# Patient Record
Sex: MALE
[De-identification: names, ages, dates, MRNs, and addresses within clinical notes are randomized per-mention and may not be internally consistent; named-entity substitution may affect disease eponyms.]

---

## 2018-11-28 ENCOUNTER — HOSPITAL ENCOUNTER (OUTPATIENT)
Dept: HOSPITAL 88 - OR | Age: 48
Discharge: HOME | End: 2018-11-28
Attending: SPECIALIST
Payer: COMMERCIAL

## 2018-11-28 VITALS — DIASTOLIC BLOOD PRESSURE: 80 MMHG | SYSTOLIC BLOOD PRESSURE: 128 MMHG

## 2018-11-28 DIAGNOSIS — Z01.810: ICD-10-CM
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
## 2018-11-28 DIAGNOSIS — M75.111: ICD-10-CM

## 2018-11-28 DIAGNOSIS — M65.811: ICD-10-CM

## 2018-11-28 DIAGNOSIS — S43.431A: Primary | ICD-10-CM

## 2018-11-28 PROCEDURE — 29807 SHO ARTHRS SRG RPR SLAP LES: CPT

## 2018-11-28 PROCEDURE — 93005 ELECTROCARDIOGRAM TRACING: CPT

## 2019-03-14 ENCOUNTER — HOSPITAL ENCOUNTER (OUTPATIENT)
Dept: HOSPITAL 88 - OT | Age: 49
LOS: 17 days | End: 2019-03-31
Attending: SPECIALIST
Payer: COMMERCIAL

## 2019-03-14 DIAGNOSIS — M25.512: ICD-10-CM

## 2019-03-14 DIAGNOSIS — S46.002D: Primary | ICD-10-CM

## 2019-03-14 DIAGNOSIS — M25.612: ICD-10-CM

## 2019-03-14 DIAGNOSIS — R53.1: ICD-10-CM

## 2019-04-18 ENCOUNTER — HOSPITAL ENCOUNTER (OUTPATIENT)
Dept: HOSPITAL 88 - OT | Age: 49
LOS: 12 days | End: 2019-04-30
Attending: SPECIALIST
Payer: COMMERCIAL

## 2019-04-18 DIAGNOSIS — M25.511: Primary | ICD-10-CM

## 2019-04-18 DIAGNOSIS — M75.101: ICD-10-CM

## 2019-04-18 PROCEDURE — 97139 UNLISTED THERAPEUTIC PX: CPT

## 2024-10-03 NOTE — OPERATIVE REPORT
DATE OF PROCEDURE:  November 28, 2018 

 

PREOPERATIVE DIAGNOSIS:  Right shoulder labral tear.



POSTOPERATIVE DIAGNOSES

1. Right shoulder labral tear.  

2. Right shoulder synovitis.  

3. Right shoulder partial rotator cuff tear.



PROCEDURES PERFORMED:  The patient underwent a

1. Right shoulder examination under anesthesia. 

2. Right shoulder arthroscopy.  

3. Right shoulder arthroscopic debridement of synovitis.  

4. Right shoulder arthroscopic debridement of a partial rotator cuff 

tear.  

5. Right shoulder arthroscopic superior labral anterior-posterior 

repair. 



ASSISTANT:  Rocio Gauthier.



ANESTHESIA:  General endotracheal intubation anesthesia.



INTRAVENOUS FLUIDS:  As per the anesthesia record.



OPERATIVE PROCEDURE IN DETAIL:  Mr. David was taken to the operating room 

and placed in the supine position on the operating room table.  Following 

induction of general anesthesia as well as endotracheal intubation, the 

patient's right upper extremity was examined under anesthesia.  He was 

found to have full passive range of motion of the shoulder joint.  There 

was no evidence of instability.  The patient's upper extremity was prepped 

and draped in standard surgical fashion.  Standard anterior and 

posterolateral arthroscopy portals were created without difficulty.  The 

scope was placed within the shoulder joint atraumatically.  Examination of 

the glenohumeral articulation demonstrated no significant glenohumeral 

wear.  There were no loose bodies in the shoulder joint.  Examination of 

the rotator cuff tissue demonstrated a partial thickness tear of the 

leading edge of the rotator cuff.  The biceps was found to be contained 

within the shoulder joint.  The biceps attachment, however, was displaced 

from the superior edge of the glenoid, and this included a labral tear from 

the 11 o'clock to 1 o'clock position.  A shaver was placed through the 

anterior portal, and the insertion site for the labrum was debrided 

thoroughly.  The partial thickness rotator cuff tear was also debrided at 

this time.  A second anterolateral accessory portal was also created.  Two 

suture anchors were inserted into the superior rim of the glenoid, and the 

sutures from the suture anchors were woven around the labrum and the labrum 

was tied firmly to the glenoid.  A probe was then placed within the 

shoulder joint, and the biceps tendon and its glenoid attachment were 

firmly probed and it was found to have excellent adhesion to the bony 

surfaces.  Anatomic restoration of the labrum had been achieved.  The 

shoulder was then debrided of synovitis.  The shoulder was then deflated of 

its sterile normal saline.  The portal sites were closed in a single layer 

fashion.  Sterile dressings were applied as well as a shoulder immobilizer. 

 The patient was then awakened and taken to the postanesthesia care unit in 

stable condition.  Rocio Gauthier acted as the first assistant for this case 

and was necessary both the prepping and draping of the patient as well as 

the positioning of the arm and the passage of suture that allowed this case 

to be successful.  











DD:  11/29/2018 16:58

DT:  11/29/2018 17:42

Job#:  [id number] EV
[id number] Days|| ||00\01||False;[id number]:4-6 Days|| ||00\01||False;[id number]:4-6 Days|| ||00\01||False;